# Patient Record
Sex: MALE | Race: AMERICAN INDIAN OR ALASKA NATIVE | HISPANIC OR LATINO | Employment: UNEMPLOYED | URBAN - METROPOLITAN AREA
[De-identification: names, ages, dates, MRNs, and addresses within clinical notes are randomized per-mention and may not be internally consistent; named-entity substitution may affect disease eponyms.]

---

## 2019-10-17 ENCOUNTER — HOSPITAL ENCOUNTER (EMERGENCY)
Facility: HOSPITAL | Age: 44
Discharge: HOME | End: 2019-10-17
Attending: EMERGENCY MEDICINE
Payer: OTHER GOVERNMENT

## 2019-10-17 ENCOUNTER — APPOINTMENT (EMERGENCY)
Dept: CT IMAGING | Facility: HOSPITAL | Age: 44
End: 2019-10-17
Payer: OTHER GOVERNMENT

## 2019-10-17 VITALS
SYSTOLIC BLOOD PRESSURE: 110 MMHG | WEIGHT: 159.83 LBS | DIASTOLIC BLOOD PRESSURE: 62 MMHG | HEART RATE: 60 BPM | OXYGEN SATURATION: 98 % | TEMPERATURE: 97.6 F | RESPIRATION RATE: 18 BRPM

## 2019-10-17 DIAGNOSIS — S06.0X9A CONCUSSION: Primary | ICD-10-CM

## 2019-10-17 PROCEDURE — 99284 EMERGENCY DEPT VISIT MOD MDM: CPT

## 2019-10-17 PROCEDURE — 70450 CT HEAD/BRAIN W/O DYE: CPT

## 2019-10-17 PROCEDURE — 99284 EMERGENCY DEPT VISIT MOD MDM: CPT | Performed by: EMERGENCY MEDICINE

## 2019-10-17 RX ORDER — ACETAMINOPHEN 325 MG/1
650 TABLET ORAL ONCE
Status: COMPLETED | OUTPATIENT
Start: 2019-10-17 | End: 2019-10-17

## 2019-10-17 RX ORDER — SENNOSIDES 8.6 MG
650 CAPSULE ORAL EVERY 8 HOURS PRN
Qty: 30 TABLET | Refills: 0 | Status: SHIPPED | OUTPATIENT
Start: 2019-10-17

## 2019-10-17 RX ADMIN — ACETAMINOPHEN 650 MG: 325 TABLET, FILM COATED ORAL at 10:25

## 2019-10-17 NOTE — ED ATTENDING ATTESTATION
10/17/2019  I, Marika Ramos MD, saw and evaluated the patient  I have discussed the patient with the resident/non-physician practitioner and agree with the resident's/non-physician practitioner's findings, Plan of Care, and MDM as documented in the resident's/non-physician practitioner's note, except where noted  All available labs and Radiology studies were reviewed  I was present for key portions of any procedure(s) performed by the resident/non-physician practitioner and I was immediately available to provide assistance  At this point I agree with the current assessment done in the Emergency Department  I have conducted an independent evaluation of this patient a history and physical is as follows:    14-year-old incarcerated male with no prior medical history presenting after a fall yesterday in shelter  Patient states he was getting into bed when he slipped, had a ground level fall and hit the back of his head on his concrete bed  He thinks he may have lost consciousness for moment  Reports pain in his entire head since that time  Endorses intermittent blurry vision when looking far way    No vision changes when looking directly in front of him  Headache is described as diffuse, throbbing  Also endorses feeling more tired than usual   No difficulty ambulating  Denies nausea or vomiting  On exam patient's vital signs are within normal limits  He denies other areas of pain other than in his feet which is chronic for him with his neuropathy  His pupils are equal, round, and reactive to light bilaterally, extraocular movements intact  Cranial nerves 2-12 intact  5/5 strength in the proximal and distal bilateral upper and lower extremities with intact sensation to light touch throughout  Normal gait  Heart with regular rate and rhythm, no murmurs, rubs, or gallops  Lungs CTAB  Abdomen benign  Plan to obtain CT head to rule out intracranial bleed  Suspect concussion    Concussion precautions discussed with patient and guard at bedside      ED Course         Critical Care Time  Procedures

## 2019-10-17 NOTE — ED PROVIDER NOTES
History  Chief Complaint   Patient presents with    Headache     Last night pt fell and  stated pt hit head off concrete from 3 foot fall     Patient is a 41year old incarcerated male who is brought to the ED at [address]  [facility] by correctional officers after falling while getting down from his bed bunk last night  Patient states it happened around 9 pm and he hit his head and his left shoulder  He reports feeling lightheaded after the nighttime medications, which he cannot name  He tripped on the last step and fell and hit his head  He states he lost consciousness for a few seconds  He reports blurry vision, fatigue and generalized headache  He denies previous medical history, however states he has neuropathy of the lower extremities bilaterally due to alcoholism  He denies nausea and vomiting  He denies any other complaints  History provided by:  Patient   used: No    Headache   Pain location:  Generalized  Radiates to:  Does not radiate  Onset quality:  Sudden  Duration:  12 hours  Timing:  Constant  Progression:  Unchanged  Chronicity:  New  Relieved by:  None tried  Ineffective treatments:  None tried  Associated symptoms: fatigue (Feels "sleepy" since he hit his head)    Associated symptoms: no abdominal pain, no back pain, no cough, no fever, no focal weakness, no nausea, no neck pain, no numbness, no tingling, no vomiting and no weakness        None       History reviewed  No pertinent past medical history  History reviewed  No pertinent surgical history  History reviewed  No pertinent family history  I have reviewed and agree with the history as documented  Social History     Tobacco Use    Smoking status: Current Every Day Smoker    Smokeless tobacco: Never Used   Substance Use Topics    Alcohol use:  Yes    Drug use: Not Currently        Review of Systems   Constitutional: Positive for fatigue (Feels "sleepy" since he hit his head)  Negative for fever  HENT: Negative  Eyes: Negative  Respiratory: Negative for cough, chest tightness, shortness of breath and wheezing  Cardiovascular: Negative for chest pain, palpitations and leg swelling  Gastrointestinal: Negative for abdominal distention, abdominal pain, nausea and vomiting  Endocrine: Negative  Genitourinary: Negative for dysuria, flank pain and hematuria  Musculoskeletal: Negative for back pain and neck pain  Skin: Negative  Neurological: Positive for light-headedness and headaches  Negative for focal weakness, facial asymmetry, weakness and numbness  Hematological: Negative  Psychiatric/Behavioral: Negative  Physical Exam  ED Triage Vitals [10/17/19 0908]   Temperature Pulse Respirations Blood Pressure SpO2   97 6 °F (36 4 °C) 62 18 104/59 98 %      Temp Source Heart Rate Source Patient Position - Orthostatic VS BP Location FiO2 (%)   Oral Monitor Lying Right arm --      Pain Score       --             Orthostatic Vital Signs  Vitals:    10/17/19 0908   BP: 104/59   Pulse: 62   Patient Position - Orthostatic VS: Lying       Physical Exam   Constitutional: He is oriented to person, place, and time  He appears well-developed and well-nourished  He is cooperative  Non-toxic appearance  He does not have a sickly appearance  He does not appear ill  No distress  HENT:   Head: Normocephalic  Head is with contusion  Head is without raccoon's eyes, without abrasion and without laceration  Eyes: Pupils are equal, round, and reactive to light  Conjunctivae and EOM are normal  Right eye exhibits no discharge  Left eye exhibits no discharge  No scleral icterus  Neck: Normal range of motion  Neck supple  No JVD present  No tracheal deviation present  Cardiovascular: Normal rate, regular rhythm and normal heart sounds  Exam reveals no gallop and no friction rub  No murmur heard    Pulmonary/Chest: Effort normal and breath sounds normal  No stridor  No respiratory distress  He has no wheezes  He has no rales  He exhibits no tenderness  Abdominal: Soft  Bowel sounds are normal  He exhibits no distension  There is no tenderness  There is no guarding  Neurological: He is alert and oriented to person, place, and time  He has normal strength  No cranial nerve deficit or sensory deficit  He exhibits normal muscle tone  Gait normal  GCS eye subscore is 4  GCS verbal subscore is 5  GCS motor subscore is 6  Speech: There is no aphasia or dysarthria  Skin: Skin is warm and dry  Capillary refill takes less than 2 seconds  No rash noted  He is not diaphoretic  No erythema  No pallor  Psychiatric: His behavior is normal    Nursing note and vitals reviewed  ED Medications  Medications   acetaminophen (TYLENOL) tablet 650 mg (has no administration in time range)       Diagnostic Studies  Results Reviewed     None                 CT head without contrast   Final Result by Doc Chu MD (10/17 1001)      No acute intracranial abnormality  Left parietal scalp hematoma without underlying fracture  Right maxillary inflammatory sinus disease  Workstation performed: [id number]               Procedures  Procedures        ED Course                               MDM  Number of Diagnoses or Management Options  Concussion: new and requires workup  Diagnosis management comments: Patient presents with headache, fatigue and lightheadedness after hitting his head while attempting to get down from his bunk bed last night  Neurologic exam is normal  This is likely to be a concussion  CT head shows "No acute intracranial abnormality  Left parietal scalp hematoma without underlying fracture  Right maxillary inflammatory sinus disease"  Patient received one dose of tylenol for the headache  He is hemodynamically stable and has been cleared for discharge   Diagnosis and natural course of a concussion were explained to him as well as warning signs that will warrant returning to the ED  Amount and/or Complexity of Data Reviewed  Tests in the radiology section of CPT®: ordered and reviewed  Independent visualization of images, tracings, or specimens: yes    Patient Progress  Patient progress: stable        Disposition  Final diagnoses:   Concussion     Time reflects when diagnosis was documented in both MDM as applicable and the Disposition within this note     Time User Action Codes Description Comment    10/17/2019 10:09 AM Rustam Jolly Add [[id number]] Concussion       ED Disposition     ED Disposition Condition Date/Time Comment    Discharge Stable Thu Oct 17, 2019 10:09 AM Boris Guzman discharge to home/self care  Follow-up Information     Follow up With Specialties Details Why Contact Info Additional Information    [facility] Emergency Medicine  If symptoms worsen [address] [facility] [address] [phone]  ED, [address] [place] [address], [address]          Patient's Medications   Discharge Prescriptions    ACETAMINOPHEN (TYLENOL) 650 MG CR TABLET    Take 1 tablet (650 mg total) by mouth every 8 (eight) hours as needed for mild pain       Start Date: 10/17/2019End Date: --       Order Dose: 650 mg       Quantity: 30 tablet    Refills: 0     No discharge procedures on file  ED Provider  Attending physically available and evaluated [facility]  I managed the patient along with the ED Attending      Electronically Signed by         Erwin Garcia MD  10/17/19 4719

## 2019-10-17 NOTE — ED NOTES
Pt seen, assessed and d/c by provider  Pt appeared to be in no acute distress upon discharge  Pt able to ambulate well without assistance upon exiting        Josue Mejía, TYRONE  10/17/19 2678